# Patient Record
Sex: FEMALE | Race: WHITE
[De-identification: names, ages, dates, MRNs, and addresses within clinical notes are randomized per-mention and may not be internally consistent; named-entity substitution may affect disease eponyms.]

---

## 2019-04-17 ENCOUNTER — HOSPITAL ENCOUNTER (EMERGENCY)
Dept: HOSPITAL 74 - JER | Age: 84
Discharge: HOME | End: 2019-04-17
Payer: COMMERCIAL

## 2019-04-17 VITALS — BODY MASS INDEX: 20.5 KG/M2

## 2019-04-17 VITALS — DIASTOLIC BLOOD PRESSURE: 76 MMHG | HEART RATE: 79 BPM | SYSTOLIC BLOOD PRESSURE: 155 MMHG

## 2019-04-17 VITALS — TEMPERATURE: 97.7 F

## 2019-04-17 DIAGNOSIS — S32.19XA: Primary | ICD-10-CM

## 2019-04-17 DIAGNOSIS — F03.90: ICD-10-CM

## 2019-04-17 DIAGNOSIS — I31.3: ICD-10-CM

## 2019-04-17 LAB
ALBUMIN SERPL-MCNC: 3.4 G/DL (ref 3.4–5)
ALP SERPL-CCNC: 237 U/L (ref 45–117)
ALT SERPL-CCNC: 18 U/L (ref 13–61)
ANION GAP SERPL CALC-SCNC: 6 MMOL/L (ref 8–16)
APPEARANCE UR: CLEAR
AST SERPL-CCNC: 21 U/L (ref 15–37)
BACTERIA #/AREA URNS HPF: 16.3 /HPF
BASOPHILS # BLD: 0.8 % (ref 0–2)
BILIRUB SERPL-MCNC: 0.6 MG/DL (ref 0.2–1)
BILIRUB UR STRIP.AUTO-MCNC: NEGATIVE MG/DL
BUN SERPL-MCNC: 20 MG/DL (ref 7–18)
CALCIUM SERPL-MCNC: 8.9 MG/DL (ref 8.5–10.1)
CASTS #/AREA URNS LPF: 2 /LPF (ref 0–8)
CHLORIDE SERPL-SCNC: 104 MMOL/L (ref 98–107)
CO2 SERPL-SCNC: 29 MMOL/L (ref 21–32)
COLOR UR: YELLOW
CREAT SERPL-MCNC: 0.6 MG/DL (ref 0.55–1.3)
DEPRECATED RDW RBC AUTO: 13.2 % (ref 11.6–15.6)
EOSINOPHIL # BLD: 1.6 % (ref 0–4.5)
EPITH CASTS URNS QL MICRO: 3 /HPF
GLUCOSE SERPL-MCNC: 81 MG/DL (ref 74–106)
HCT VFR BLD CALC: 37.7 % (ref 32.4–45.2)
HGB BLD-MCNC: 12.6 GM/DL (ref 10.7–15.3)
KETONES UR QL STRIP: (no result)
LEUKOCYTE ESTERASE UR QL STRIP.AUTO: NEGATIVE
LIPASE SERPL-CCNC: 150 U/L (ref 73–393)
LYMPHOCYTES # BLD: 11 % (ref 8–40)
MCH RBC QN AUTO: 32.1 PG (ref 25.7–33.7)
MCHC RBC AUTO-ENTMCNC: 33.5 G/DL (ref 32–36)
MCV RBC: 95.8 FL (ref 80–96)
MONOCYTES # BLD AUTO: 13.9 % (ref 3.8–10.2)
NEUTROPHILS # BLD: 72.7 % (ref 42.8–82.8)
NITRITE UR QL STRIP: NEGATIVE
PH UR: 7 [PH] (ref 5–8)
PLATELET # BLD AUTO: 243 K/MM3 (ref 134–434)
PMV BLD: 9 FL (ref 7.5–11.1)
POTASSIUM SERPLBLD-SCNC: 4.1 MMOL/L (ref 3.5–5.1)
PROT SERPL-MCNC: 6.6 G/DL (ref 6.4–8.2)
PROT UR QL STRIP: NEGATIVE
PROT UR QL STRIP: NEGATIVE
RBC # BLD AUTO: 14 /HPF (ref 0–4)
RBC # BLD AUTO: 3.94 M/MM3 (ref 3.6–5.2)
SODIUM SERPL-SCNC: 139 MMOL/L (ref 136–145)
SP GR UR: 1.02 (ref 1.01–1.03)
UROBILINOGEN UR STRIP-MCNC: 0.2 MG/DL (ref 0.2–1)
WBC # BLD AUTO: 10.9 K/MM3 (ref 4–10)
WBC # UR AUTO: 2 /HPF (ref 0–5)

## 2019-04-17 NOTE — PDOC
*Physical Exam





- Vital Signs


 Last Vital Signs











Temp Pulse Resp BP Pulse Ox


 


 97.7 F   72   18   146/62   98 


 


 04/17/19 09:10  04/17/19 09:10  04/17/19 09:10  04/17/19 09:10  04/17/19 09:10














ED Treatment Course





- LABORATORY


CBC & Chemistry Diagram: 


 04/17/19 10:44





 04/17/19 10:44





- ADDITIONAL ORDERS


Additional order review: 


 Laboratory  Results











  04/17/19 04/17/19





  11:13 10:44


 


Sodium   139


 


Potassium   4.1


 


Chloride   104


 


Carbon Dioxide   29


 


Anion Gap   6 L


 


BUN   20 H


 


Creatinine   0.6


 


Creat Clearance w eGFR   94.35


 


Random Glucose   81


 


Calcium   8.9


 


Total Bilirubin   0.6


 


AST   21


 


ALT   18


 


Alkaline Phosphatase   237 H


 


Total Protein   6.6


 


Albumin   3.4


 


Lipase   150


 


Urine Color  Yellow 


 


Urine Appearance  Clear 


 


Urine pH  7.0 


 


Ur Specific Gravity  1.021 


 


Urine Protein  Negative 


 


Urine Glucose (UA)  Negative 


 


Urine Ketones  1+ H 


 


Urine Blood  1+ H 


 


Urine Nitrite  Negative 


 


Urine Bilirubin  Negative 


 


Urine Urobilinogen  0.2 


 


Ur Leukocyte Esterase  Negative 


 


Urine WBC (Auto)  2 


 


Urine RBC (Auto)  14 


 


Urine Casts (Auto)  2 


 


U Epithel Cells (Auto)  3.0 


 


Urine Bacteria (Auto)  16.3 








 











  04/17/19





  10:44


 


RBC  3.94


 


MCV  95.8


 


MCHC  33.5


 


RDW  13.2


 


MPV  9.0


 


Neutrophils %  72.7


 


Lymphocytes %  11.0


 


Monocytes %  13.9 H


 


Eosinophils %  1.6


 


Basophils %  0.8














Medical Decision Making





- Medical Decision Making





04/17/19 11:40





87 yo F h/o dementia presenting to the ER with a complaint of abdominal pain x 

3 days


Pt s/p outpatient xray which revealed constipation


Pt brought to the ER for re assessment


family does not know if she has had a bowel movement (she is not supervised in 

the bathroom)


Family initially refused CT scanning


Agreeable once Dr Osorio was called





Pt seen by Midlevel Provider under my direct supervision


Pt interviewed and examined


Ancillary studies reviewed





Pt family refusing to stay in the hospital





I agree with plan as outlined by Midlevel Provider


04/17/19 11:42


 





*DC/Admit/Observation/Transfer


Diagnosis at time of Disposition: 


 Bilateral sacral insufficiency fracture, Pericardial effusion








- Discharge Dispostion


Disposition: HOME


Condition at time of disposition: Stable





- Prescriptions


Prescriptions: 


Lidocaine 5% Patch [Lidoderm Patch -] 1 patch TP DAILY #30 patch





- Referrals


Referrals: 


Rani Tierney MD [Staff Physician] - Call tomorrow


Arjun Osorio MD [Primary Care Provider] - 1 week





- Patient Instructions


Printed Discharge Instructions:  Sacral Stress Fracture


Additional Instructions: 


-Rest, apply lidoderm patches to the painful area as prescribed, and take 

Tylenol as needed for pain


-Discontinue Aleve


-You may need subacute rehabilitation for recovery; please discuss with Dr. Osorio


-Please call Dr. Tierney's office in the morning to arrange followup regarding 

your pericardial effusion


-Return here for uncontrolled pain, difficulty breathing, or any other 

concerning symptoms





- Post Discharge Activity

## 2019-04-17 NOTE — PDOC
History of Present Illness





- General


Chief Complaint: Pain


Stated Complaint: ABD PAIN


Time Seen by Provider: 04/17/19 09:55


History Source: Patient


Exam Limitations: No Limitations





- History of Present Illness


Initial Comments: 





04/17/19 10:48


CHIEF COMPLAINT: Abdominal pain





HISTORY OF PRESENT ILLNESS: This is an 88-year-old female with no medical 

history other than dementia who presents with her  and daughter who 

report that the patient has been complaining of abdominal pain for two days. 

Her  states that she goes to the bathroom alone and so he cannot be sure 

when her last bowel movement was, however he notes that recent outpatient x-

rays prompted by back pain were notable for large stool burden. The patient has 

not had any nausea/vomiting, dysuria, fevers/chills, or any other symptoms.





Vital signs on arrival are unremarkable.








REVIEW OF SYSTEMS: Provided primarily by 


GENERAL/CONSTITUTIONAL: No fever or chills. No night sweats or weight loss.


HEAD, EYES, EARS, NOSE AND THROAT: No change in vision. No ear pain or 

discharge. No sore throat.


CARDIOVASCULAR: No chest pain or shortness of breath.


RESPIRATORY: No cough, wheezing or hemoptysis.


GASTROINTESTINAL: Lower abdominal pain, intermittent. No nausea, vomiting, 

diarrhea, or constipation.


GENITOURINARY: No dysuria, frequency, or change in urination.


MUSCULOSKELETAL: No joint or muscle swelling or pain. No neck or back pain.


SKIN: No rash or easy bruising.


NEUROLOGIC: No headache, vertigo, loss of consciousness, or change in strength/

sensation.


ENDOCRINE: No increased thirst or urination.


HEMATOLOGIC/LYMPHATIC: No anemia, easy bleeding, or history of blood clots.


ALLERGIC/IMMUNOLOGIC: No hives or skin allergy.





PHYSICAL EXAM:


GENERAL: Awake, alert, follows commands, oriented x 1 (baseline).


HEAD: No signs of trauma, normocephalic, atraumatic. 


EYES: PERRLA, EOMI, sclera anicteric, conjunctiva clear.


ENT: Auricles normal inspection, hearing grossly normal, nares patent, 

oropharynx clear without


exudates. Moist mucosa.


NECK: Normal ROM, supple, no lymphadenopathy, JVD, or masses.


LUNGS: Clear to auscultation bilaterally.


HEART: Regular rate and rhythm, normal S1 and S2, no murmurs, rubs or gallops, 

peripheral pulses normal and equal bilaterally. 


ABDOMEN: Abdomen soft, +LLQ tenderness, bowel sounds normoactive.


EXTREMITIES: No LE edema or calf tenderness.


NEUROLOGICAL: Cranial nerves II through XII grossly intact.  Normal speech, 

normal gait with some assistance.


SKIN: Warm, Dry, normal turgor, no rashes or lesions noted.





Past History





- Past Medical History


Allergies/Adverse Reactions: 


 Allergies











Allergy/AdvReac Type Severity Reaction Status Date / Time


 


No Known Allergies Allergy   Verified 04/17/19 09:23











Home Medications: 


Ambulatory Orders





Lidocaine 5% Patch [Lidoderm Patch -] 1 patch TP DAILY #30 patch 04/17/19 


Mirtazapine 15 mg PO HS 04/17/19 











- Suicide/Smoking/Psychosocial Hx


Smoking History: Never smoked


Have you smoked in the past 12 months: No


Information on smoking cessation initiated: No


Hx Alcohol Use: No


Drug/Substance Use Hx: No





*Physical Exam





- Vital Signs


 Last Vital Signs











Temp Pulse Resp BP Pulse Ox


 


 97.7 F   72   18   146/62   98 


 


 04/17/19 09:10  04/17/19 09:10  04/17/19 09:10  04/17/19 09:10  04/17/19 09:10














ED Treatment Course





- LABORATORY


CBC & Chemistry Diagram: 


 04/17/19 10:44





 04/17/19 10:44





Medical Decision Making





- Medical Decision Making





04/17/19 10:55


A/P: 88-year-old female with LLQ.





-Labs including CBC, CMP, lipase, UA/culture


-AXR


-CTAP recommended but declined by family





04/17/19 12:29


AXR reviewed: no acute process.


Case discussed with Dr. CHANG Osorio who recommends CTAP. Family agrees.





*DC/Admit/Observation/Transfer


Diagnosis at time of Disposition: 


 Bilateral sacral insufficiency fracture, Pericardial effusion








- Discharge Dispostion


Disposition: HOME


Condition at time of disposition: Stable


Decision to Admit order: No





- Prescriptions


Prescriptions: 


Lidocaine 5% Patch [Lidoderm Patch -] 1 patch TP DAILY #30 patch





- Referrals


Referrals: 


Rani Tierney MD [Staff Physician] - Call tomorrow


Arjun Osorio MD [Primary Care Provider] - 1 week





- Patient Instructions


Printed Discharge Instructions:  Sacral Stress Fracture


Additional Instructions: 


-Rest, apply lidoderm patches to the painful area as prescribed, and take 

Tylenol as needed for pain


-Discontinue Aleve


-You may need subacute rehabilitation for recovery; please discuss with Dr. Osorio


-Please call Dr. Tierney's office in the morning to arrange followup regarding 

your pericardial effusion


-Return here for uncontrolled pain, difficulty breathing, or any other 

concerning symptoms





- Post Discharge Activity